# Patient Record
Sex: FEMALE | Race: WHITE | URBAN - METROPOLITAN AREA
[De-identification: names, ages, dates, MRNs, and addresses within clinical notes are randomized per-mention and may not be internally consistent; named-entity substitution may affect disease eponyms.]

---

## 2017-01-19 ENCOUNTER — TELEPHONE (OUTPATIENT)
Dept: OBGYN | Facility: CLINIC | Age: 57
End: 2017-01-19

## 2017-01-19 DIAGNOSIS — F41.9 ANXIETY: Primary | ICD-10-CM

## 2017-01-19 RX ORDER — LORAZEPAM 0.5 MG/1
0.5 TABLET ORAL EVERY 8 HOURS PRN
Qty: 30 TABLET | Refills: 0 | Status: SHIPPED | OUTPATIENT
Start: 2017-01-19

## 2017-01-19 NOTE — TELEPHONE ENCOUNTER
Reason for Call:  Other call back    Detailed comments: Mother passed away unexpectedly would like a refill  Today for Lorazepam   [facility] [id number] [place] [address] [id number] AT [address] [place] [address]    Phone Number Patient can be reached at: Home number on file [phone] (home)    Best Time: Anytime    Can we leave a detailed message on this number? YES    Call taken on 1/19/2017 at 10:32 AM by Anna Serra

## 2017-01-19 NOTE — TELEPHONE ENCOUNTER
Annual 10/10/16. Pt mother passed away unexpectedly. Pt is requesting refill of Lorazepam. Tried to call pt twice but mail box is full. Pt is on metformin twice a day and xanax as needed. Routing to Dr. Carter, on call. Please advise.

## 2017-01-19 NOTE — TELEPHONE ENCOUNTER
Looked at pt's medical record, this Rx has not been prescribed by our office in the past for this pt. Called pt to clarify. Pt states she has not been prescribed this Rx in the past but her sister gave her some of her prescription and the pt states it has been helping her to sleep in the midst of her stress and grief of loosing her mother and she is requesting to have her own Rx for lorazepam. Asked pt what dose her sister gave her and the pt did not know, she stated she would call her sister to see what the dosing was and call back.

## 2017-01-28 DIAGNOSIS — E11.9 DIABETES MELLITUS TYPE II, CONTROLLED, WITH NO COMPLICATIONS (H): Primary | ICD-10-CM

## 2017-01-30 NOTE — TELEPHONE ENCOUNTER
"Metformin 500mg      Last Written Prescription Date:  1/25/16  Last Fill Quantity: 180,   # refills: 3  Last Office Visit with [facility] primary care provider:  10/10/16  Future Office visit: none    POC note 10/10/16: \"Patient had borderline sugars and A1C for years and then finally officially became diabetic. Is on metformin now and her last A1C was good. In addition she cut out all bread and most sweets and is down just over 20# since she was last here. Feeling good about that but really misses her bread.     Routing refill request to provider for review/approval because:  Rx was not filled at pt's last annual. Dr. Singleton out of office, note routed to on call Dr. Rodriguez-ok to fill until pt's next annual due time?   "

## 2017-02-02 NOTE — TELEPHONE ENCOUNTER
Message received:  Looks like Mani expected her to go to endocrine.        Ok for 3 month refill, but she should be going to endocrine.       M      LM for pt informing pt needs to see endocrine. Informed 3 month Rx sent to pharmacy.

## 2017-04-05 DIAGNOSIS — E11.9 DIABETES MELLITUS TYPE II, CONTROLLED, WITH NO COMPLICATIONS (H): ICD-10-CM

## 2017-11-07 ENCOUNTER — TRANSFERRED RECORDS (OUTPATIENT)
Dept: HEALTH INFORMATION MANAGEMENT | Facility: CLINIC | Age: 57
End: 2017-11-07

## 2017-11-16 DIAGNOSIS — E11.9 DIABETES MELLITUS TYPE 2, UNCOMPLICATED (H): ICD-10-CM

## 2017-11-16 DIAGNOSIS — E11.9 CONTROLLED TYPE 2 DIABETES MELLITUS WITHOUT COMPLICATION, WITHOUT LONG-TERM CURRENT USE OF INSULIN (H): Primary | ICD-10-CM

## 2017-11-17 RX ORDER — BLOOD SUGAR DIAGNOSTIC
STRIP MISCELLANEOUS
Qty: 100 STRIP | Refills: 2 | Status: SHIPPED | OUTPATIENT
Start: 2017-11-17

## 2017-11-17 NOTE — TELEPHONE ENCOUNTER
ACCU-CHEK KAYLIN PLUS strips      Last Written Prescription Date:  1/25/16  Last Fill Quantity: 1,   # refills: 0  Last Office Visit with [facility] primary care provider:  1/10/16-Mani  Future Office visit: none    Routing refill request to provider for review/approval because:  This Rx was being prescribed by Myla Diamond, Dr. Singleton last saw for annual. Pt had Type II DM. Not on refill protocol- ok to refill?

## 2017-11-25 ENCOUNTER — HEALTH MAINTENANCE LETTER (OUTPATIENT)
Age: 57
End: 2017-11-25

## 2018-01-25 ENCOUNTER — TELEPHONE (OUTPATIENT)
Dept: OBGYN | Facility: CLINIC | Age: 58
End: 2018-01-25

## 2018-01-25 NOTE — TELEPHONE ENCOUNTER
1/25/2018    Call Regarding Preventive Health Screening Cervical/PAP and LDL    Attempt 1    Message on voicemail     Comments:       Outreach   Sb

## 2018-01-26 NOTE — TELEPHONE ENCOUNTER
Per outreach, patient is aware of overdue screening and will call on their own time for scheduling.     Thanks

## 2021-01-01 NOTE — TELEPHONE ENCOUNTER
Metformin 500mg      Last Written Prescription Date:  2/2/17  Last Fill Quantity: 180,   # refills: 0  Last Office Visit [place] [facility] [place]:  10/10/16  Future Office visit: none    Pt was informed to go to endocrine to continue management of this medication. Pt has already received a 3 month extension to allow time to get in to be seen. Rx denied.    Statement Selected

## 2021-05-29 ENCOUNTER — RECORDS - HEALTHEAST (OUTPATIENT)
Dept: ADMINISTRATIVE | Facility: CLINIC | Age: 61
End: 2021-05-29